# Patient Record
Sex: MALE | URBAN - METROPOLITAN AREA
[De-identification: names, ages, dates, MRNs, and addresses within clinical notes are randomized per-mention and may not be internally consistent; named-entity substitution may affect disease eponyms.]

---

## 2024-09-23 ENCOUNTER — APPOINTMENT (OUTPATIENT)
Dept: URBAN - METROPOLITAN AREA CLINIC 310 | Age: 69
Setting detail: DERMATOLOGY
End: 2024-09-23

## 2024-09-23 DIAGNOSIS — S31000A OPEN WOUND(S) (MULTIPLE) OF UNSPECIFIED SITE(S), WITHOUT MENTION OF COMPLICATION: ICD-10-CM

## 2024-09-23 PROBLEM — T07.XXXA UNSPECIFIED MULTIPLE INJURIES, INITIAL ENCOUNTER: Status: ACTIVE | Noted: 2024-09-23

## 2024-09-23 PROCEDURE — OTHER PHOTO-DOCUMENTATION: OTHER

## 2024-09-23 PROCEDURE — OTHER ADDITIONAL NOTES: OTHER

## 2024-09-23 PROCEDURE — OTHER PRESCRIPTION MEDICATION MANAGEMENT: OTHER

## 2024-09-23 PROCEDURE — OTHER MIPS QUALITY: OTHER

## 2024-09-23 PROCEDURE — 99203 OFFICE O/P NEW LOW 30 MIN: CPT

## 2024-09-23 PROCEDURE — OTHER COUNSELING: OTHER

## 2024-09-23 NOTE — PROCEDURE: PRESCRIPTION MEDICATION MANAGEMENT
Render In Strict Bullet Format?: No
Plan: Recommended patient to wear compression socks or compression wraps and to continue being treated by wound care.  As compared to photographs from patients phone, wounds show marked improvement.  There is good granulation tissue present and lesions are clean, shallow, non draining and no signs of infection are appreciated.  No concern for other causes of slow healing wounds appreciated on exam necessitating biopsy.   The progression of wound healing, especially to lower legs, have been discussed with patient.  Photographs taken today.  He will contact our office if any additional questions or concerns were to arise
Detail Level: Zone

## 2024-09-23 NOTE — PROCEDURE: ADDITIONAL NOTES
Additional Notes: Patient is currently being treated by wound care. Patient was also referred to  a vascular surgeon by wound care for further evaluation.
Detail Level: Simple
Render Risk Assessment In Note?: no